# Patient Record
Sex: FEMALE | Race: BLACK OR AFRICAN AMERICAN | NOT HISPANIC OR LATINO | Employment: STUDENT | URBAN - METROPOLITAN AREA
[De-identification: names, ages, dates, MRNs, and addresses within clinical notes are randomized per-mention and may not be internally consistent; named-entity substitution may affect disease eponyms.]

---
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
## 2023-03-26 ENCOUNTER — HOSPITAL ENCOUNTER (EMERGENCY)
Facility: OTHER | Age: 23
Discharge: HOME | End: 2023-03-26
Attending: EMERGENCY MEDICINE
Payer: COMMERCIAL

## 2023-03-26 VITALS
HEIGHT: 62 IN | OXYGEN SATURATION: 99 % | BODY MASS INDEX: 22.08 KG/M2 | SYSTOLIC BLOOD PRESSURE: 123 MMHG | TEMPERATURE: 98 F | HEART RATE: 71 BPM | DIASTOLIC BLOOD PRESSURE: 74 MMHG | WEIGHT: 120 LBS

## 2023-03-26 DIAGNOSIS — R14.0 ABDOMINAL DISTENSION: Primary | ICD-10-CM

## 2023-03-26 LAB
B-HCG UR QL: NEGATIVE
CTP QC/QA: YES

## 2023-03-26 PROCEDURE — 81025 URINE PREGNANCY TEST: CPT | Performed by: EMERGENCY MEDICINE

## 2023-03-26 PROCEDURE — 99282 EMERGENCY DEPT VISIT SF MDM: CPT

## 2023-03-26 NOTE — ED PROVIDER NOTES
Encounter Date: 3/26/2023    SCRIBE #1 NOTE: I, Nabil Ayala, am scribing for, and in the presence of,  Julianna Lizama MD. I have scribed the following portions of the note - Other sections scribed: HPI, ROS, PE.     History     Chief Complaint   Patient presents with    Abdominal Pain     Bulging in lower abdominal area and upper groin that comes and goes. Noticed the bulging last Friday.   Denies pain currently      Time seen by provider: 4:27 AM    This is a 22 y.o. female who presents with complaint of intermittent lower abdominal distension for five days. Patient states she first noted abdominal soreness after doing sit-ups, and the swelling began the following day. She describes a band of swelling that goes across the abdomen. No GI or  PSHx. This is the extent of the patient's complaints at this time.    The history is provided by the patient.   Review of patient's allergies indicates:   Allergen Reactions    Peanut Anaphylaxis     Had peanut butter cookie for first time at 8 years old and had lip swelling, stomach ache and vomiting one hour after  Feel watery in mouth , lip swelling , heavy throat.      Peanut oil Anaphylaxis     Feel watery in mouth, lip swelling , heavy throat. Had peanut butter cookie for first time at 8 years old with lip swelling, stomachache and vomiting one hour after    Amoxicillin Other (See Comments)     Reaction unknown       Past Medical History:   Diagnosis Date    Viral pericarditis      History reviewed. No pertinent surgical history.  History reviewed. No pertinent family history.  Social History     Tobacco Use    Smoking status: Never    Smokeless tobacco: Never   Substance Use Topics    Alcohol use: Yes    Drug use: Yes     Types: Marijuana     Review of Systems   Constitutional:  Negative for fever.   HENT:  Negative for sore throat.    Respiratory:  Negative for shortness of breath.    Cardiovascular:  Negative for chest pain.   Gastrointestinal:  Positive for abdominal  distention. Negative for nausea.   Genitourinary:  Negative for dysuria.   Musculoskeletal:  Negative for back pain.   Skin:  Negative for rash.   Neurological:  Negative for weakness.   Hematological:  Does not bruise/bleed easily.     Physical Exam     Initial Vitals [03/26/23 0409]   BP Pulse Resp Temp SpO2   123/74 71 -- 98.2 °F (36.8 °C) 99 %      MAP       --         Physical Exam    Constitutional: She appears well-developed and well-nourished. She does not have a sickly appearance. No distress.   HENT:   Head: Normocephalic and atraumatic.   Right Ear: External ear normal.   Left Ear: External ear normal.   Eyes: Conjunctivae, EOM and lids are normal. Right eye exhibits no discharge. Left eye exhibits no discharge. Right conjunctiva is not injected. Right conjunctiva has no hemorrhage. Left conjunctiva is not injected. Left conjunctiva has no hemorrhage. No scleral icterus.   Neck: Phonation normal. No stridor present. No tracheal deviation present.   Normal range of motion.  Cardiovascular:            Pulses:       Radial pulses are 2+ on the right side and 2+ on the left side.        Dorsalis pedis pulses are 2+ on the right side and 2+ on the left side.   Abdominal: Abdomen is soft. She exhibits no distension. There is no abdominal tenderness. There is no rebound and no guarding.   Musculoskeletal:      Cervical back: Normal range of motion.     Neurological: She is alert and oriented to person, place, and time. She has normal strength. GCS eye subscore is 4. GCS verbal subscore is 5. GCS motor subscore is 6.   Skin: Skin is warm.   Psychiatric: She has a normal mood and affect. Her speech is normal and behavior is normal. Judgment and thought content normal. Cognition and memory are normal.       ED Course   Procedures  Labs Reviewed   POCT URINE PREGNANCY          Imaging Results    None          Medications - No data to display  Medical Decision Making:   History:   Old Medical Records: I decided to  obtain old medical records.  Clinical Tests:   Lab Tests: Reviewed and Ordered  Additional MDM:   Comments: 22-year-old female presents for evaluation of bulging of her lower abdomen.  She was triaged abdominal pain, however, she denies any associated pain.  She does report that the bulging sensation started after doing abdominal exercises and has improved over the past couple days.  Her abdominal exam was benign.  She had no palpable mass and no tenderness to palpation.  She did show me pictures of her abdomen when she 1st noticed it as well as couple of days later.  There was slight prominence the lower abdomen which appeared more like bloating.  Given the absence of any other associated symptoms at present and that her symptoms are improving with a benign abdominal exam, I do not feel any further workup was required emergently other than a UPT which was negative.  I did discuss this with the patient who verbalized understanding and agreement..      Scribe Attestation:   Scribe #1: I performed the above scribed service and the documentation accurately describes the services I performed. I attest to the accuracy of the note.  Physician Attestation for Scribe: I, Julianna Lizama  , reviewed documentation as scribed in my presence, which is both accurate and complete.                     Clinical Impression:   Final diagnoses:  [R14.0] Abdominal distension (Primary)        ED Disposition Condition    Discharge Stable          ED Prescriptions    None       Follow-up Information       Follow up With Specialties Details Why Contact Info    primary care  Schedule an appointment as soon as possible for a visit  As needed if symptoms persist              Julianna Lizama MD  03/26/23 9917

## 2023-03-26 NOTE — ED TRIAGE NOTES
Pt reports intermittent bilateral lower abd bloating for 5 days s/p working out. Pt denies any pain at this time. Pt AAOx4, VSS, NADN.